# Patient Record
Sex: FEMALE | Race: BLACK OR AFRICAN AMERICAN | NOT HISPANIC OR LATINO | URBAN - METROPOLITAN AREA
[De-identification: names, ages, dates, MRNs, and addresses within clinical notes are randomized per-mention and may not be internally consistent; named-entity substitution may affect disease eponyms.]

---
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
## 2024-03-09 NOTE — ED PEDIATRIC TRIAGE NOTE - NS ED NURSE BANDS TYPE
- Cont free water 400 cc q 6 hrs  - Monitor BMP Name band; - Cont free water 250 cc q 6 hrs  - Monitor BMP - Resolved on IVF 3/8  - free water reduced to 250 cc q 6 hrs  - Monitor BMP